# Patient Record
Sex: FEMALE | Race: WHITE | NOT HISPANIC OR LATINO | Employment: STUDENT | URBAN - METROPOLITAN AREA
[De-identification: names, ages, dates, MRNs, and addresses within clinical notes are randomized per-mention and may not be internally consistent; named-entity substitution may affect disease eponyms.]

---

## 2017-05-02 ENCOUNTER — OFFICE VISIT (OUTPATIENT)
Dept: PODIATRY | Facility: CLINIC | Age: 22
End: 2017-05-02
Payer: COMMERCIAL

## 2017-05-02 VITALS
WEIGHT: 115 LBS | SYSTOLIC BLOOD PRESSURE: 117 MMHG | HEIGHT: 62 IN | HEART RATE: 103 BPM | BODY MASS INDEX: 21.16 KG/M2 | DIASTOLIC BLOOD PRESSURE: 74 MMHG

## 2017-05-02 DIAGNOSIS — L60.0 INGROWN NAIL: ICD-10-CM

## 2017-05-02 DIAGNOSIS — M79.674 PAIN OF TOE OF RIGHT FOOT: Primary | ICD-10-CM

## 2017-05-02 PROCEDURE — 99204 OFFICE O/P NEW MOD 45 MIN: CPT | Mod: 25,S$GLB,, | Performed by: PODIATRIST

## 2017-05-02 PROCEDURE — 11750 EXCISION NAIL&NAIL MATRIX: CPT | Mod: T5,S$GLB,, | Performed by: PODIATRIST

## 2017-05-02 PROCEDURE — 99999 PR PBB SHADOW E&M-EST. PATIENT-LVL III: CPT | Mod: PBBFAC,,, | Performed by: PODIATRIST

## 2017-05-02 NOTE — MR AVS SNAPSHOT
[place] [facility]   [facility]  [place] [address]  Phone: [phone]                  Nicki Ojeda   2017 8:45 AM   Office Visit    Description:  Female : 1995   Provider:  Yulissa Mortensen DPM   Department:  [place] [facility]           Reason for Visit     Ingrown Toenail     Plantar Warts           Diagnoses this Visit        Comments    Pain of toe of right foot    -  Primary     Ingrown nail                To Do List           Future Appointments        Provider Department Dept Phone    5/15/2017 1:00 PM Yulissa Mortensen DPM [facility] Pod[facility] [phone]      Goals (5 Years of Data)     None      Follow-Up and Disposition     Return in about 10 days (around 2017) for post-op ingrown, or sooner if concerned.    Follow-up and Disposition History      [facility]     [facility] On [facility] Nurse Care Line -  Assistance  Unless otherwise directed by your provider, please contact [facility], our nurse care line that is available for  assistance.     Registered nurses in the [facility] Center provide: appointment scheduling, clinical advisement, health education, and other advisory services.  Call: [phone] (toll free)               Medications           Message regarding Medications     Verify the changes and/or additions to your medication regime listed below are the same as discussed with your clinician today.  If any of these changes or additions are incorrect, please notify your healthcare provider.             Verify that the below list of medications is an accurate representation of the medications you are currently taking.  If none reported, the list may be blank. If incorrect, please contact your healthcare provider. Carry this list with you in case of emergency.           Current Medications     desogestrel-ethinyl estradiol (APRI) 0.15-0.03 mg per tablet Take 1 tablet by mouth once daily.    isotretinoin (ABSORICA) 40 MG capsule Take 40 mg  "by mouth once daily.           Clinical Reference Information           Your Vitals Were     BP Pulse Height Weight BMI    117/74 103 5' 2" (1.575 m) 52.2 kg (115 lb) 21.03 kg/m2      Blood Pressure          Most Recent Value    BP  117/74      Allergies as of 5/2/2017     No Known Allergies      Immunizations Administered on Date of Encounter - 5/2/2017     None      Orders Placed During Today's Visit      Normal Orders This Visit    Nail Removal       [facility] Sign-Up     Activating your [facility] account is as easy as 1-2-3!     1) Visit [email or web address], select Sign Up Now, enter this activation code and your date of birth, then select Next.  [email or web address]  Expires: 6/16/2017  9:46 AM      2) Create a username and password to use when you visit [facility] in the future and select a security question in case you lose your password and select Next.    3) Enter your e-mail address and click Sign Up!    Additional Information  If you have questions, please e-mail [email or web address] or call [phone] to talk to our [facility] staff. Remember, [facility] is NOT to be used for urgent needs. For medical emergencies, dial 911.         Instructions    AFTER TOENAIL PROCEDURE INSTRUCTIONS    1. Leave bandage intact until you shower (12-24 hours). If the bandage sticks as you try to remove it, soak it in warm water until it lifts off.    2. Dry foot completely after showering, and apply a small amount of triple antibiotic ointment (Neosporin works fine!) and a fabric or cloth bandaid ("plastic" bandaids tend to lift off with ointment use).  Wear open-toed shoes as needed for comfort.     3. Take Advil or Tylenol as needed for pain.     4. Your toe may drain for the next few days. Normal drainage is yellow-to-pink, and clear, much like the fluid in a blister. Watch for redness spreading up your toe into your foot, white thick drainage (pus), pain unrelieved by medication, or nausea/vomiting/fever/chills. These are " signs of infection. Please call the clinic or visit your doctor.    5. You may stop dressing toe once it stops draining (about 3 - 7 days).         Language Assistance Services     ATTENTION: Language assistance services are available, free of charge. Please call [phone].      ATENCIÓN: Si gurjit brown, tiene a damon disposición servicios gratuitos de asistencia lingüística. Llame al [phone].     CHÚ Ý: N?u b?n nói Ti?ng Vi?t, có các d?ch v? h? tr? ngôn ng? mi?n phí dành cho b?n. G?i s? [phone].         [facility] [facility] complies with applicable Federal civil rights laws and does not discriminate on the basis of race, color, national origin, age, disability, or sex.

## 2017-05-02 NOTE — PROGRESS NOTES
"CC:  Ingrown toenail    HPI:   Patient is a 21 y.o. female with complaints of painful toenail on the right great toe.  The pain started a couple of weeks.  Patient denies acute trauma to the toe.    Home treatment: topical  antibiotics    History reviewed. No pertinent past medical history.    Current Outpatient Prescriptions on File Prior to Visit   Medication Sig Dispense Refill    desogestrel-ethinyl estradiol (APRI) 0.15-0.03 mg per tablet Take 1 tablet by mouth once daily.      isotretinoin (ABSORICA) 40 MG capsule Take 40 mg by mouth once daily.       No current facility-administered medications on file prior to visit.         ALLG:  Review of patient's allergies indicates:  No Known Allergies          ROS:  General ROS: negative for chills, fatigue or fever  Cardiovascular ROS: no chest pain or dyspnea on exertion  Musculoskeletal ROS: negative for - joint pain or joint stiffness.  Negative for loss of strength  Neuro ROS: Negative for syncope, numbness, or muscle weakness  Skin ROS: Negative for rash, itching or hair changes.  +Toenail changes      EXAM:   Vitals:    05/02/17 0909   BP: 117/74   Pulse: 103   Weight: 52.2 kg (115 lb)   Height: 5' 2" (1.575 m)       Right LOWER EXTREMITY EXAM:    Vascular:  Dorsalis pedis and posterior tibial pulses are palpable. capillary refill time is within normal limits and toes are warm to touch.  There is  presence of digital hair.  There is  mild localized edema to the affected area.     Neurological:  Light touch, proprioception, and sharp/dull sensation are all intact.     Dermatological:  The toenail is incurvated, Lateral border of the Right hallux.    mild erythema noted to the affected area.     Present paronychia.     Absent abscess    Musculoskeletal:  Muscle strength is 5/5 in all groups .    normal toes without deformities        ASSESSMENT/PLAN     I counseled the patient on her conditions, their implications and medical management.       Pain of toe of " right foot    Ingrown nail - Right Foot       Nail Removal  Date/Time: 5/2/2017 9:35 AM  Performed by: NICHOLAS FLOWER  Authorized by: NICHOLAS FLOWER     Consent Done?:  Yes (Written)    Location:  Right foot  Location detail:  Right big toe  Anesthesia:  Local infiltration  Local anesthetic: lidocaine 2% without epinephrine and bupivacaine 0.5% without epinephrine  Anesthetic total (ml):  2  Preparation:  Skin prepped with Betadine    Amount removed:  Partial  Nail removed location: lateral border.  Wedge excision of skin of nail fold: No    Nail bed sutured?: No    Nail matrix removed:  Partial (phenol was used)  Dressing applied:  Antibiotic ointment and dressing applied  Patient tolerance:  Patient tolerated the procedure well with no immediate complications        Verbal and written post operative instructions were provided to the patient.     Patient to monitor for any adverse reactions and Return in about 10 days (around 5/12/2017) for post-op ingrown, or sooner if concerned.

## 2017-05-02 NOTE — PATIENT INSTRUCTIONS
"AFTER TOENAIL PROCEDURE INSTRUCTIONS    1. Leave bandage intact until you shower (12-24 hours). If the bandage sticks as you try to remove it, soak it in warm water until it lifts off.    2. Dry foot completely after showering, and apply a small amount of triple antibiotic ointment (Neosporin works fine!) and a fabric or cloth bandaid ("plastic" bandaids tend to lift off with ointment use).  Wear open-toed shoes as needed for comfort.     3. Take Advil or Tylenol as needed for pain.     4. Your toe may drain for the next few days. Normal drainage is yellow-to-pink, and clear, much like the fluid in a blister. Watch for redness spreading up your toe into your foot, white thick drainage (pus), pain unrelieved by medication, or nausea/vomiting/fever/chills. These are signs of infection. Please call the clinic or visit your doctor.    5. You may stop dressing toe once it stops draining (about 3 - 7 days).    "

## 2017-05-15 ENCOUNTER — OFFICE VISIT (OUTPATIENT)
Dept: PODIATRY | Facility: CLINIC | Age: 22
End: 2017-05-15
Payer: COMMERCIAL

## 2017-05-15 VITALS
BODY MASS INDEX: 21.16 KG/M2 | WEIGHT: 115 LBS | HEART RATE: 98 BPM | SYSTOLIC BLOOD PRESSURE: 107 MMHG | DIASTOLIC BLOOD PRESSURE: 66 MMHG | HEIGHT: 62 IN

## 2017-05-15 DIAGNOSIS — B07.0 PLANTAR VERRUCA: ICD-10-CM

## 2017-05-15 DIAGNOSIS — L60.0 INGROWN NAIL: ICD-10-CM

## 2017-05-15 DIAGNOSIS — Z09 FOLLOW-UP EXAM AFTER TREATMENT: Primary | ICD-10-CM

## 2017-05-15 DIAGNOSIS — M79.674 PAIN OF TOE OF RIGHT FOOT: ICD-10-CM

## 2017-05-15 PROCEDURE — 99213 OFFICE O/P EST LOW 20 MIN: CPT | Mod: S$GLB,,, | Performed by: PODIATRIST

## 2017-05-15 PROCEDURE — 99999 PR PBB SHADOW E&M-EST. PATIENT-LVL III: CPT | Mod: PBBFAC,,, | Performed by: PODIATRIST

## 2017-05-15 PROCEDURE — 1160F RVW MEDS BY RX/DR IN RCRD: CPT | Mod: S$GLB,,, | Performed by: PODIATRIST

## 2017-05-15 NOTE — MR AVS SNAPSHOT
[facility]  [address]  [facility] [address]  Phone: [phone]                  Nicki Ojeda   5/15/2017 1:00 PM   Office Visit    Description:  Female : 1995   Provider:  Yulissa Mortensen DPM   Department:  [facility]           Reason for Visit     PCP     Ingrown Toenail           Diagnoses this Visit        Comments    Follow-up exam after treatment    -  Primary     Pain of toe of right foot         Ingrown nail         Plantar verruca                To Do List           Future Appointments        Provider Department Dept Phone    2017 8:00 AM Yulissa Mortensen DPM [place] [facility] [phone]      Goals (5 Years of Data)     None      Follow-Up and Disposition     Return in about 1 week (around 2017) for wart removal procedure.      [facility]     [facility] On Call Nurse Care Line -  Assistance  Unless otherwise directed by your provider, please contact [facility], our nurse care line that is available for  assistance.     Registered nurses in the [facility] On Call Center provide: appointment scheduling, clinical advisement, health education, and other advisory services.  Call: [phone] (toll free)               Medications           Message regarding Medications     Verify the changes and/or additions to your medication regime listed below are the same as discussed with your clinician today.  If any of these changes or additions are incorrect, please notify your healthcare provider.             Verify that the below list of medications is an accurate representation of the medications you are currently taking.  If none reported, the list may be blank. If incorrect, please contact your healthcare provider. Carry this list with you in case of emergency.           Current Medications     desogestrel-ethinyl estradiol (APRI) 0.15-0.03 mg per tablet Take 1 tablet by mouth once daily.    isotretinoin (ABSORICA) 40 MG capsule Take 40 mg by  "mouth once daily.           Clinical Reference Information           Your Vitals Were     BP Pulse Height Weight BMI    107/66 98 5' 2" (1.575 m) 52.2 kg (115 lb) 21.03 kg/m2      Blood Pressure          Most Recent Value    BP  107/66      Allergies as of 5/15/2017     No Known Allergies      Immunizations Administered on Date of Encounter - 5/15/2017     None      [facility] Sign-Up     Activating your [facility] account is as easy as 1-2-3!     1) Visit [email or web address], select Sign Up Now, enter this activation code and your date of birth, then select Next.  [email or web address]  Expires: 6/16/2017  9:46 AM      2) Create a username and password to use when you visit [facility] in the future and select a security question in case you lose your password and select Next.    3) Enter your e-mail address and click Sign Up!    Additional Information  If you have questions, please e-mail [email or web address] or call [phone] to talk to our [facility] staff. Remember, [facility] is NOT to be used for urgent needs. For medical emergencies, dial 911.         Language Assistance Services     ATTENTION: Language assistance services are available, free of charge. Please call [phone].      ATENCIÓN: Si habla español, tiene a damon disposición servicios gratuitos de asistencia lingüística. Llame al [phone].     CHÚ Ý: N?u b?n nói Ti?ng Vi?t, có các d?ch v? h? tr? ngôn ng? mi?n phí dành cho b?n. G?i s? [phone].         [facility] complies with applicable Federal civil rights laws and does not discriminate on the basis of race, color, national origin, age, disability, or sex.        "

## 2017-05-15 NOTE — PROGRESS NOTES
"S:   21 y.o. female returns for follow up s/p ingrown toenail procedure - right hallux .  Patient is healing well, no complaints. Has been keeping the toe covered as instructed. Patient has no pain today. Patient denies constitutional symptoms.   She also has a wart on the plantar right foot present for many years, history of excision.  However recurrent.  She is interested in another procedure to remove the lesion.    No past medical history on file.    Current Outpatient Prescriptions on File Prior to Visit   Medication Sig Dispense Refill    desogestrel-ethinyl estradiol (APRI) 0.15-0.03 mg per tablet Take 1 tablet by mouth once daily.      isotretinoin (ABSORICA) 40 MG capsule Take 40 mg by mouth once daily.       No current facility-administered medications on file prior to visit.          Review of patient's allergies indicates:  No Known Allergies        O:   Vitals:    05/15/17 1339   BP: 107/66   Pulse: 98   Weight: 52.2 kg (115 lb)   Height: 5' 2" (1.575 m)      S/p  right hallux ingrown toenail matrixectomy. minimal erythema;  no ascending cellulitis. no swelling. No drainage.  The site is healing well. No signs of infection. Pedal pulses are palpable. Range of motion intact. no tenderness to palpation.   1cm oval verrucous lesion sub 4th metatarsal head of the right foot with petechiae.  tenderness to palpation.   Pulses palpable.  No gross foot deformity  Sensorimotor function intac.t          A/P:   1. Follow-up exam after treatment     2. Pain of toe of right foot     3. Ingrown nail - Right Foot     4. Plantar verruca - Right Foot          S/p  ingrown toenail chemical matrixectomy. Patient is healing well.    Discussed treatment options for plantar verruca, including topical meds vs excision.  Patient would like excision.  We will schedule for procedure.     Return in about 1 week (around 5/22/2017) for wart removal procedure.     "

## 2017-05-23 ENCOUNTER — OFFICE VISIT (OUTPATIENT)
Dept: PODIATRY | Facility: CLINIC | Age: 22
End: 2017-05-23
Payer: COMMERCIAL

## 2017-05-23 VITALS
SYSTOLIC BLOOD PRESSURE: 112 MMHG | HEIGHT: 62 IN | BODY MASS INDEX: 21.16 KG/M2 | WEIGHT: 115 LBS | DIASTOLIC BLOOD PRESSURE: 73 MMHG | HEART RATE: 113 BPM

## 2017-05-23 DIAGNOSIS — B07.0 PLANTAR VERRUCA: Primary | ICD-10-CM

## 2017-05-23 DIAGNOSIS — M79.671 RIGHT FOOT PAIN: ICD-10-CM

## 2017-05-23 PROCEDURE — 88305 TISSUE EXAM BY PATHOLOGIST: CPT | Mod: 26,,, | Performed by: PATHOLOGY

## 2017-05-23 PROCEDURE — 11421 EXC H-F-NK-SP B9+MARG 0.6-1: CPT | Mod: S$GLB,,, | Performed by: PODIATRIST

## 2017-05-23 PROCEDURE — 88305 TISSUE EXAM BY PATHOLOGIST: CPT | Performed by: PATHOLOGY

## 2017-05-23 PROCEDURE — 99499 UNLISTED E&M SERVICE: CPT | Mod: S$GLB,,, | Performed by: PODIATRIST

## 2017-05-23 PROCEDURE — 99999 PR PBB SHADOW E&M-EST. PATIENT-LVL II: CPT | Mod: PBBFAC,,, | Performed by: PODIATRIST

## 2017-05-23 NOTE — PROGRESS NOTES
"S:   21 y.o. female returns for removal of plantar wart, right foot.    The wart has been present for many years, with history of excision by her Dermatologist in [place].  However recurrent.  She is interested in another procedure to remove the lesion.        History reviewed. No pertinent past medical history.      Current Outpatient Prescriptions on File Prior to Visit   Medication Sig Dispense Refill    desogestrel-ethinyl estradiol (APRI) 0.15-0.03 mg per tablet Take 1 tablet by mouth once daily.      isotretinoin (ABSORICA) 40 MG capsule Take 40 mg by mouth once daily.       No current facility-administered medications on file prior to visit.          Review of patient's allergies indicates:  No Known Allergies        O:   Vitals:    05/23/17 0813   BP: 112/73   Pulse: (!) 113   Weight: 52.2 kg (115 lb)   Height: 5' 2" (1.575 m)      Range of motion intact. no tenderness to palpation.   1cm oval verrucous lesion sub 4th metatarsal head of the right foot with petechiae.  tenderness to palpation.   Pulses palpable.   No gross foot deformity  Sensorimotor function intact          A/P:   1. Plantar verruca - Right Foot     2. Right foot pain          I counseled the patient on her conditions, their implications and medical management.     Procedures :    PROCEDURE NOTE:     Date:  05/23/2017  Time:  8:30AM  Procedure:  Removal of painful plantar wart, right foot.   Indications:  Recurrent, painful plantar wart, right foot  Consent:  an informed consent was obtained.  Indications, risks, and alternatives to the procedure were explained to the patient.  The patient was given the opportunity to ask questions and patient consented to the procedure.  Anesthesia:  3ccs of a 1:1 mixture of 2% plain lidocaine and 0.5% plain marcaine  Description of procedure:  A time out was performed.  the affected area was anesthetized with local anesthesia.   The area was cleaned and prepped with betadine.   Using sterile forceps, " and #15 blade, the mass/lesion was excised in toto, and placed in a sterile specimen container.   The area was irrigated with sterile normal saline.  Silver nitrate was used to cauterized the area.  Triple antibiotics was applied to the surgical site.  A well-padded dressing was placed.     Complications:  None  EBL:  <1cc  Disposition:   Patient tolerated the procedure well.   Instructions provided for home care.      -Yulissa Mortensen DPM      Return in about 3 weeks (around 6/13/2017) for post-op, or sooner if concerned.

## 2017-06-20 ENCOUNTER — OFFICE VISIT (OUTPATIENT)
Dept: PODIATRY | Facility: CLINIC | Age: 22
End: 2017-06-20
Payer: COMMERCIAL

## 2017-06-20 VITALS
HEART RATE: 100 BPM | DIASTOLIC BLOOD PRESSURE: 77 MMHG | SYSTOLIC BLOOD PRESSURE: 119 MMHG | BODY MASS INDEX: 21.16 KG/M2 | WEIGHT: 115 LBS | HEIGHT: 62 IN

## 2017-06-20 DIAGNOSIS — B07.0 PLANTAR VERRUCA: ICD-10-CM

## 2017-06-20 DIAGNOSIS — Z09 FOLLOW-UP EXAM AFTER TREATMENT: Primary | ICD-10-CM

## 2017-06-20 PROCEDURE — 99999 PR PBB SHADOW E&M-EST. PATIENT-LVL III: CPT | Mod: PBBFAC,,, | Performed by: PODIATRIST

## 2017-06-20 PROCEDURE — 99024 POSTOP FOLLOW-UP VISIT: CPT | Mod: S$GLB,,, | Performed by: PODIATRIST

## 2017-06-20 NOTE — PROGRESS NOTES
"POST OP NOTE    S:  Patient returns to clinic status post removal of wart.  Patient denies fevers, chills, night sweats.  Patient reports pain is well managed.  She is in regular shoes.  Able to walk around the [facility] past couple of days without issues.   No new complaints.    History reviewed. No pertinent past medical history.    Current Outpatient Prescriptions on File Prior to Visit   Medication Sig Dispense Refill    desogestrel-ethinyl estradiol (APRI) 0.15-0.03 mg per tablet Take 1 tablet by mouth once daily.      isotretinoin (ABSORICA) 40 MG capsule Take 40 mg by mouth once daily.       No current facility-administered medications on file prior to visit.        O:  Vitals:    06/20/17 0818   BP: 119/77   Pulse: 100   Weight: 52.2 kg (115 lb)   Height: 5' 2" (1.575 m)      S/p excision of wart. No open wounds.  No evidence of infection.  Well healed.    Pulses intact.  No sensorimotor deficits      A:  1. Follow-up exam after treatment     2. Plantar verruca - Right Foot         s/p right plantar wart excision with satisfactory progress.      P:  Return to regular activities as tolerated.   Lysol shoes.  Clean socks daily.   Monitor.  Return if symptoms worsen or fail to improve.   Patient is amenable to plan.    "